# Patient Record
Sex: MALE | Race: WHITE | NOT HISPANIC OR LATINO | Employment: UNEMPLOYED | URBAN - METROPOLITAN AREA
[De-identification: names, ages, dates, MRNs, and addresses within clinical notes are randomized per-mention and may not be internally consistent; named-entity substitution may affect disease eponyms.]

---

## 2025-01-31 ENCOUNTER — HOSPITAL ENCOUNTER (EMERGENCY)
Facility: HOSPITAL | Age: 40
Discharge: HOME | End: 2025-02-01
Attending: EMERGENCY MEDICINE
Payer: MEDICAID

## 2025-01-31 ENCOUNTER — APPOINTMENT (OUTPATIENT)
Dept: CARDIOLOGY | Facility: HOSPITAL | Age: 40
End: 2025-01-31
Payer: MEDICAID

## 2025-01-31 DIAGNOSIS — Z00.8 ENCOUNTER FOR PSYCHOLOGICAL EVALUATION: Primary | ICD-10-CM

## 2025-01-31 LAB
ALBUMIN SERPL BCP-MCNC: 4.3 G/DL (ref 3.4–5)
ALP SERPL-CCNC: 64 U/L (ref 33–120)
ALT SERPL W P-5'-P-CCNC: 50 U/L (ref 10–52)
AMPHETAMINES UR QL SCN: NORMAL
ANION GAP SERPL CALC-SCNC: 16 MMOL/L (ref 10–20)
APAP SERPL-MCNC: <10 UG/ML
APPEARANCE UR: CLEAR
AST SERPL W P-5'-P-CCNC: 51 U/L (ref 9–39)
BARBITURATES UR QL SCN: NORMAL
BASOPHILS # BLD AUTO: 0.02 X10*3/UL (ref 0–0.1)
BASOPHILS NFR BLD AUTO: 0.3 %
BENZODIAZ UR QL SCN: NORMAL
BILIRUB SERPL-MCNC: 0.5 MG/DL (ref 0–1.2)
BILIRUB UR STRIP.AUTO-MCNC: NEGATIVE MG/DL
BUN SERPL-MCNC: 13 MG/DL (ref 6–23)
BZE UR QL SCN: NORMAL
CALCIUM SERPL-MCNC: 8.7 MG/DL (ref 8.6–10.3)
CANNABINOIDS UR QL SCN: NORMAL
CHLORIDE SERPL-SCNC: 104 MMOL/L (ref 98–107)
CO2 SERPL-SCNC: 24 MMOL/L (ref 21–32)
COLOR UR: YELLOW
CREAT SERPL-MCNC: 1.03 MG/DL (ref 0.5–1.3)
EGFRCR SERPLBLD CKD-EPI 2021: >90 ML/MIN/1.73M*2
EOSINOPHIL # BLD AUTO: 0.14 X10*3/UL (ref 0–0.7)
EOSINOPHIL NFR BLD AUTO: 2.1 %
ERYTHROCYTE [DISTWIDTH] IN BLOOD BY AUTOMATED COUNT: 13.2 % (ref 11.5–14.5)
ETHANOL SERPL-MCNC: <10 MG/DL
FENTANYL+NORFENTANYL UR QL SCN: NORMAL
GLUCOSE SERPL-MCNC: 199 MG/DL (ref 74–99)
GLUCOSE UR STRIP.AUTO-MCNC: NORMAL MG/DL
HCT VFR BLD AUTO: 44 % (ref 41–52)
HGB BLD-MCNC: 15 G/DL (ref 13.5–17.5)
HYALINE CASTS #/AREA URNS AUTO: ABNORMAL /LPF
IMM GRANULOCYTES # BLD AUTO: 0.07 X10*3/UL (ref 0–0.7)
IMM GRANULOCYTES NFR BLD AUTO: 1.1 % (ref 0–0.9)
KETONES UR STRIP.AUTO-MCNC: ABNORMAL MG/DL
LEUKOCYTE ESTERASE UR QL STRIP.AUTO: NEGATIVE
LYMPHOCYTES # BLD AUTO: 1.42 X10*3/UL (ref 1.2–4.8)
LYMPHOCYTES NFR BLD AUTO: 21.5 %
MCH RBC QN AUTO: 30.4 PG (ref 26–34)
MCHC RBC AUTO-ENTMCNC: 34.1 G/DL (ref 32–36)
MCV RBC AUTO: 89 FL (ref 80–100)
METHADONE UR QL SCN: NORMAL
MONOCYTES # BLD AUTO: 0.76 X10*3/UL (ref 0.1–1)
MONOCYTES NFR BLD AUTO: 11.5 %
MUCOUS THREADS #/AREA URNS AUTO: ABNORMAL /LPF
NEUTROPHILS # BLD AUTO: 4.2 X10*3/UL (ref 1.2–7.7)
NEUTROPHILS NFR BLD AUTO: 63.5 %
NITRITE UR QL STRIP.AUTO: NEGATIVE
NRBC BLD-RTO: 0 /100 WBCS (ref 0–0)
OPIATES UR QL SCN: NORMAL
OXYCODONE+OXYMORPHONE UR QL SCN: NORMAL
PCP UR QL SCN: NORMAL
PH UR STRIP.AUTO: 6 [PH]
PLATELET # BLD AUTO: 198 X10*3/UL (ref 150–450)
POTASSIUM SERPL-SCNC: 3.5 MMOL/L (ref 3.5–5.3)
PROT SERPL-MCNC: 6.7 G/DL (ref 6.4–8.2)
PROT UR STRIP.AUTO-MCNC: ABNORMAL MG/DL
RBC # BLD AUTO: 4.93 X10*6/UL (ref 4.5–5.9)
RBC # UR STRIP.AUTO: NEGATIVE /UL
RBC #/AREA URNS AUTO: ABNORMAL /HPF
SALICYLATES SERPL-MCNC: <3 MG/DL
SODIUM SERPL-SCNC: 140 MMOL/L (ref 136–145)
SP GR UR STRIP.AUTO: 1.03
UROBILINOGEN UR STRIP.AUTO-MCNC: ABNORMAL MG/DL
WBC # BLD AUTO: 6.6 X10*3/UL (ref 4.4–11.3)
WBC #/AREA URNS AUTO: ABNORMAL /HPF

## 2025-01-31 PROCEDURE — 80053 COMPREHEN METABOLIC PANEL: CPT | Performed by: PHYSICIAN ASSISTANT

## 2025-01-31 PROCEDURE — 36415 COLL VENOUS BLD VENIPUNCTURE: CPT | Performed by: PHYSICIAN ASSISTANT

## 2025-01-31 PROCEDURE — 81001 URINALYSIS AUTO W/SCOPE: CPT | Performed by: PHYSICIAN ASSISTANT

## 2025-01-31 PROCEDURE — 2500000001 HC RX 250 WO HCPCS SELF ADMINISTERED DRUGS (ALT 637 FOR MEDICARE OP): Performed by: PHYSICIAN ASSISTANT

## 2025-01-31 PROCEDURE — 85025 COMPLETE CBC W/AUTO DIFF WBC: CPT | Performed by: PHYSICIAN ASSISTANT

## 2025-01-31 PROCEDURE — 80307 DRUG TEST PRSMV CHEM ANLYZR: CPT | Performed by: PHYSICIAN ASSISTANT

## 2025-01-31 PROCEDURE — 93005 ELECTROCARDIOGRAM TRACING: CPT

## 2025-01-31 PROCEDURE — 80320 DRUG SCREEN QUANTALCOHOLS: CPT | Performed by: PHYSICIAN ASSISTANT

## 2025-01-31 PROCEDURE — 99285 EMERGENCY DEPT VISIT HI MDM: CPT | Performed by: EMERGENCY MEDICINE

## 2025-01-31 RX ORDER — ACETAMINOPHEN 325 MG/1
975 TABLET ORAL ONCE
Status: COMPLETED | OUTPATIENT
Start: 2025-01-31 | End: 2025-01-31

## 2025-01-31 RX ADMIN — ACETAMINOPHEN 975 MG: 325 TABLET, FILM COATED ORAL at 21:20

## 2025-01-31 SDOH — HEALTH STABILITY: MENTAL HEALTH: HAVE YOU WISHED YOU WERE DEAD OR WISHED YOU COULD GO TO SLEEP AND NOT WAKE UP?: YES

## 2025-01-31 SDOH — HEALTH STABILITY: MENTAL HEALTH: HAVE YOU EVER DONE ANYTHING, STARTED TO DO ANYTHING, OR PREPARED TO DO ANYTHING TO END YOUR LIFE?: NO

## 2025-01-31 SDOH — HEALTH STABILITY: MENTAL HEALTH: BEHAVIORS/MOOD: COOPERATIVE

## 2025-01-31 SDOH — HEALTH STABILITY: MENTAL HEALTH: SUICIDE ASSESSMENT: ADULT (C-SSRS)

## 2025-01-31 SDOH — HEALTH STABILITY: MENTAL HEALTH: BEHAVIORAL HEALTH(WDL): EXCEPTIONS TO WDL

## 2025-01-31 SDOH — HEALTH STABILITY: MENTAL HEALTH: SLEEP PATTERN: UNABLE TO ASSESS

## 2025-01-31 SDOH — HEALTH STABILITY: MENTAL HEALTH: FOR HIGH RISK PATIENTS: 1:1 PATIENT OBSERVER AT ALL TIMES

## 2025-01-31 SDOH — HEALTH STABILITY: MENTAL HEALTH: BEHAVIORS/MOOD: SAD;COOPERATIVE

## 2025-01-31 SDOH — HEALTH STABILITY: MENTAL HEALTH: HAVE YOU ACTUALLY HAD ANY THOUGHTS OF KILLING YOURSELF?: NO

## 2025-01-31 ASSESSMENT — PAIN - FUNCTIONAL ASSESSMENT: PAIN_FUNCTIONAL_ASSESSMENT: 0-10

## 2025-01-31 ASSESSMENT — PAIN DESCRIPTION - FREQUENCY: FREQUENCY: SEVERAL DAYS A WEEK

## 2025-01-31 ASSESSMENT — PAIN DESCRIPTION - LOCATION: LOCATION: BACK

## 2025-01-31 ASSESSMENT — PAIN SCALES - GENERAL
PAINLEVEL_OUTOF10: 6
PAINLEVEL_OUTOF10: 2

## 2025-01-31 ASSESSMENT — PAIN DESCRIPTION - PAIN TYPE: TYPE: CHRONIC PAIN

## 2025-01-31 ASSESSMENT — PAIN DESCRIPTION - ORIENTATION: ORIENTATION: RIGHT;LEFT

## 2025-02-01 VITALS
RESPIRATION RATE: 16 BRPM | HEIGHT: 73 IN | OXYGEN SATURATION: 99 % | HEART RATE: 87 BPM | DIASTOLIC BLOOD PRESSURE: 94 MMHG | BODY MASS INDEX: 29.16 KG/M2 | WEIGHT: 220 LBS | TEMPERATURE: 97.2 F | SYSTOLIC BLOOD PRESSURE: 166 MMHG

## 2025-02-01 LAB — HOLD SPECIMEN: NORMAL

## 2025-02-01 SDOH — HEALTH STABILITY: MENTAL HEALTH: BEHAVIORS/MOOD: CALM;COOPERATIVE

## 2025-02-01 SDOH — HEALTH STABILITY: MENTAL HEALTH: BEHAVIORAL HEALTH(WDL): EXCEPTIONS TO WDL

## 2025-02-01 SDOH — HEALTH STABILITY: MENTAL HEALTH: ANXIETY SYMPTOMS: GENERALIZED

## 2025-02-01 SDOH — HEALTH STABILITY: MENTAL HEALTH: HAVE YOU WISHED YOU WERE DEAD OR WISHED YOU COULD GO TO SLEEP AND NOT WAKE UP?: YES

## 2025-02-01 SDOH — ECONOMIC STABILITY: HOUSING INSECURITY: FEELS SAFE LIVING IN HOME: YES

## 2025-02-01 SDOH — HEALTH STABILITY: MENTAL HEALTH: SUICIDE ASSESSMENT: ADULT (C-SSRS)

## 2025-02-01 SDOH — HEALTH STABILITY: MENTAL HEALTH: FOR HIGH RISK PATIENTS: 1:1 PATIENT OBSERVER AT ALL TIMES

## 2025-02-01 SDOH — HEALTH STABILITY: MENTAL HEALTH: WISH TO BE DEAD (PAST 1 MONTH): YES

## 2025-02-01 SDOH — HEALTH STABILITY: MENTAL HEALTH: BEHAVIORS/MOOD: COOPERATIVE

## 2025-02-01 SDOH — HEALTH STABILITY: MENTAL HEALTH: HAVE YOU ACTUALLY HAD ANY THOUGHTS OF KILLING YOURSELF?: NO

## 2025-02-01 SDOH — SOCIAL STABILITY: SOCIAL NETWORK: PARENT/GUARDIAN/SIGNIFICANT OTHER INVOLVEMENT: NO INVOLVEMENT

## 2025-02-01 SDOH — HEALTH STABILITY: MENTAL HEALTH: HAVE YOU EVER DONE ANYTHING, STARTED TO DO ANYTHING, OR PREPARED TO DO ANYTHING TO END YOUR LIFE?: NO

## 2025-02-01 SDOH — HEALTH STABILITY: MENTAL HEALTH: SUICIDAL BEHAVIOR (LIFETIME): NO

## 2025-02-01 SDOH — HEALTH STABILITY: MENTAL HEALTH: SUICIDAL BEHAVIOR (3 MONTHS): NO

## 2025-02-01 SDOH — HEALTH STABILITY: MENTAL HEALTH: DEPRESSION SYMPTOMS: FEELINGS OF HELPLESSNESS

## 2025-02-01 SDOH — HEALTH STABILITY: MENTAL HEALTH: ACTIVE SUICIDAL IDEATION WITH SOME INTENT TO ACT, WITHOUT SPECIFIC PLAN (PAST 1 MONTH): NO

## 2025-02-01 SDOH — HEALTH STABILITY: MENTAL HEALTH: ACTIVE SUICIDAL IDEATION WITH SPECIFIC PLAN AND INTENT (PAST 1 MONTH): NO

## 2025-02-01 SDOH — HEALTH STABILITY: MENTAL HEALTH: NON-SPECIFIC ACTIVE SUICIDAL THOUGHTS (PAST 1 MONTH): YES

## 2025-02-01 SDOH — HEALTH STABILITY: MENTAL HEALTH: BEHAVIORS/MOOD: COOPERATIVE;APPROPRIATE FOR SITUATION

## 2025-02-01 ASSESSMENT — LIFESTYLE VARIABLES
SUBSTANCE_ABUSE_PAST_12_MONTHS: NO
PRESCIPTION_ABUSE_PAST_12_MONTHS: NO

## 2025-02-01 NOTE — ED TRIAGE NOTES
Pt arrives to ED via ems from group home. Pt reports having flash backs of his father telling him to kill himself.  Pt is cooperative. PT is A&Ox4 and ambulatory hx of bipolar and schzhorphenia

## 2025-02-01 NOTE — PROGRESS NOTES
EPAT - Social Work Psychiatric Assessment    Arrival Details  Mode of Arrival: Ambulance  Admission Source: Home  Admission Type: Involuntary  EPAT Assessment Start Date: 02/01/25  EPAT Assessment Start Time: 0735  Name of : CHAU Garsia LSW    History of Present Illness  Admission Reason: Suicidal Ideation  HPI:     Patient is a 38yo male presenting to the ED via EMS from group home with chief complaint of suicidal ideation. Reportedly, “pt reports having flash backs of his father telling him to kill himself” and “presents for suicidal ideation without plan”. Patient's chart, triage, and provider note reviewed prior to assessment. Patient has a psychiatric history of ADHD, Mood Disorder, ID/DD, PTSD, and Psychosis. He is connected with [facility] for outpatient care and currently resides at [facility]. Patient has a significant history of inpatient admissions, most recently to [facility] 11/2-11/14/24 for the same presentation, at which point he was discharged to his current group home. Of note, patient was also recently seen at [facility] ED on 1/29/25 for “anger” and was discharged with a fill of his Zyprexa. Patient is up to date with medication, last received his monthly SANDOVAL on 1/20/25. He denied history of SIB/SA and was indicated low risk to self throughout his ED visit per frequent screener flowsheet. BAL and UTOX negative on arrival.     SW Readmission Information   Readmission within 30 Days: Yes  Previous ED Visit Date and Reason : [facility] ED 1/29/25 for anger  Previous Discharge Date and Location: same day d/c  Factors Contributing to  Readmission Inpatient/ED (Team Perspective): Cognitively Limited  Readmission Factors Team Comments: low threshold for stress, cognitive impairment    Psychiatric Symptoms  Anxiety Symptoms: Generalized  Depression Symptoms: Feelings of helplessness  Jenise Symptoms: No problems reported or observed.    Psychosis Symptoms  Hallucination Type:  Voices commenting  Delusion Type: No problems reported or observed.    Additional Symptoms - Adult  Generalized Anxiety Disorder: Difficult to control worry, Restlessness  Obsessive Compulsive Disorder: No problems reported or observed.  Panic Attack: No problems reported or observed.  Post Traumatic Stress Disorder: Traumatic event  Delirium: No problems reported or observed.    Past Psychiatric History/Meds/Treatments  Past Psychiatric History: Psychiatric Diagnosis: ADHD, Mood Disorder, ID/DD, PTSD, Psychosis // Current  Center: [facility] // Previous Admissions: [facility], most recently [facility] 11/2-11/14/24  Past Psychiatric Meds/Treatments: current med list: Start Zyprexa 5 mg by mouth at bedtime.     Continue Depakote 1000 mg twice daily.    Continue Prozac 20 mg po daily.     Received Haldol Decanoate 200 mg IM 1/20/25, next scheduled for 2/17/25 340 pm  Past Violence/Victimization History: None noted during ED visit; hx of behavioral outbursts per group home    Current Mental Health Contacts   Name/Phone Number: Stef Coreas [facility]   Last Appointment Date: 1/27/25  Provider Name/Phone Number: MONET Hodges  Provider Last Appointment Date: 1/20/25; upcoming 2/17/25    Support System: Immediate family, Community    Living Arrangement: House, Lives with someone    Home Safety  Feels Safe Living in Home: Yes    Income Information  Employment Status for: Patient  Employment Status: Disabled  Income Source: Disability  Who Man[facility] Finances if Patient Unable[facility]    [facility] Service/Education History  Current or Previous  Service: None  Education Level:  (did not assess)    Social/Cultural History  Social History: [place] Citizen: yes // Payee: [employer] // Guardian/POA: self  Cultural Requests During Hospitalization: none  Spiritual Requests During Hospitalization: none  Important Activities: Family    Legal  Criminal Activity/ Legal Involvement Pertinent to Current  Situation/ Hospitalization: None    Drug Screening  Have you used any substances (canabis, cocaine, heroin, hallucinogens, inhalants, etc.) in the past 12 months?: No  Have you used any prescription drugs other than prescribed in the past 12 months?: No  Is a toxicology screen needed?: No    Stage of Change  Stage of Change:  (n/a)    Behavioral Health  Behavioral Health(WDL): Within Defined Limits  Behaviors/Mood: Calm, Cooperative  Affect: Appropriate to circumstances    Orientation  Orientation Level: Oriented X4    General Appearance  Motor Activity: Unremarkable  Speech Pattern: Slow  General Attitude: Cooperative, Pleasant  Appearance/Hygiene: Disheveled    Thought Process  Coherency: Cache thinking  Content: Blaming others  Delusions:  (None)  Perception: Not altered  Hallucination: None  Judgment/Insight:  (Limited at baseline)  Confusion: None  Cognition: Appropriate safety awareness, Appropriate attention/concentration, Cognitive delay    Sleep Pattern  Sleep Pattern: Insomnia (Chronic per pt)    Risk Factors  Self Harm/Suicidal Ideation Plan: vague thoughts of SI, reported to be chronic per pt  Previous Self Harm/Suicidal Plans: Denies  Risk Factors: Male, Major mental illness, Lower IQ    Violence Risk Assessment  Assessment of Violence: None noted  Thoughts of Harm to Others: No    Ability to Assess Risk Screen  Risk Screen - Ability to Assess: Able to be screened  [place] Suicide Severity Rating Scale (Screener/Recent Self-Report)  1. Wish to be Dead (Past 1 Month): Yes  2. Non-Specific Active Suicidal Thoughts (Past 1 Month): Yes  3. Active Suicidal Ideation with any Methods (Not Plan) Without Intent to Act (Past 1 Month): No  4. Active Suicidal Ideation with Some Intent to Act, Without Specific Plan (Past 1 Month): No  5. Active Suicidal Ideation with Specific Plan and Intent (Past 1 Month): No  6. Suicidal Behavior (Lifetime): No  6. Suicidal Behavior (3 Months): No  Calculated C-SSRS Risk Score  (Lifetime/Recent): Low Risk  Step 1: Risk Factors  Current & Past Psychiatric Dx: Mood disorder, ADHD  Presenting Symptoms: Anxiety and/or panic  Precipitants/Stressors: Triggering events leading to humiliation, shame, and/or despair (e.g. loss of relationship, financial or health status) (real or anticipated), Pending incarceration or homelessness  Access to Lethal Methods : No  Step 2: Protective Factors   Protective Factors Internal: Fear of death or the actual act of killing self, Identifies reasons for living  Protective Factors External: Cultural, spiritual and/or moral attitudes against suicide, Supportive social network or family or friends, Positive therapeutic relationships  Step 3: Suicidal Ideation Intensity  How Many Times Have You Had These Thoughts: Less than once a week  When You Have the Thoughts How Long do They Last : Fleeting - few seconds or minutes  Could/Can You Stop Thinking About Killing Yourself or Wanting to Die if You Want to: Easily able to control thoughts  Are There Things - Anyone or Anything - That Stopped You From Wanting to Die or Acting on: Deterrents definitely stopped you from attempting suicide  What Sort of Reasons Did You Have For Thinking About Wanting to Die or Killing Yourself: Completely to get attention, revenge, or a reaction from others  Total Score: 5  Step 5: Documentation  Risk Level: Low suicide risk (Patient indicated low acute risk to self during assessment; denied SI plan/intent and identified current level of passive SI to be his baseline. Discussed with Dr. Prater)    Psychiatric Impression and Plan of Care  Assessment and Plan:    Patient has remained calm, cooperative, and in behavioral control throughout this ED visit, which continued with this writer. Upon assessment the patient demonstrated notably concrete thought process and limited insight consistent with known cognitive delay. Patient expressed belief his SANDOVAL “is not working” due to concerns regarding  “my attitude”. Patient endorsed trouble controlling his anger “when people call me names” but denied any desire to harm others as a result. He expressed concern that he will be evicted from his group home “if I don't change my attitude”. The patient further clarified that he “just doesn't like men”, which is why he has been acting out with staff at his home. Patient endorsed consistent staffing in the home, denied access to weapons, and endorsed continued compliance with medication that is administered by  staff.     The patient did endorse passive SI at time of interview, however, clarified that “I always feel this way” due to the trauma of his father's passing approx. 20 years ago. He otherwise denied active HI/AVH, no delusions were elicited, and patient did not appear to be responding to internal stimuli. When asked regarding previous reports of CAH, patient shared he “always hears my dad's voice” but denied active hallucinations or desire/intent to act when experiencing them previously. He identified his mother as primary support and deterrent to self-harm. Patient also endorsed significant support in his outpatient psychiatrist and was able to appropriately identify his upcoming appointments. The patient remained independently and spontaneously future/goal-oriented in discussing his future goals, upcoming plan for care, and desire to be placed in a different group home moving forward. He was able to participate in efforts to safety plan and identify individuals he would reach out to, including his CM or psychiatrist, if symptoms were to worsen.     This writer called 's group home, [facility], at [phone]. The individual who answered the phone identified herself as “the staff member” and requested this writer contact the  Owner, Aries, for further collateral information. The only piece of information shared was that patient had been “throwing things in the living room” and “was being  mean”. She made no mention of concern for SI/HI, even when asked explicitly.     This writer attempted to contact  owner, Aries, at [phone] multiple times. Voicemail was left with no return call at time of assessment.     Diagnostic Impression: Unspecified Mood Disorder, PTSD, ID     Psychiatric Impression and Plan for Care: Patient does not present as an acute risk to self or others & does not meet criteria for inpatient admission as he appears to be presenting at his psychiatric baseline according to both pt's self-reports and prior chart documentation. Recommendation for discharge discussed with Piero Prater MD who is in agreement.    Specific Resources Provided to Patient: Discharge back to [facility] Notified:  staff    Outcome/Disposition  Patient's Perception of Outcome Achieved: Pt requesting to be admitted due to concern he will be evicted from group home  Assessment, Recommendations and Risk Level Reviewed with: Dr. Prater  Contact Name: Aries Mejiainette  Contact Number(s): [phone]  Contact Relationship:  Owner  EPAT Assessment Completed Date: 02/01/25  EPAT Assessment Completed Time: 0949  Patient Disposition: Home

## 2025-02-01 NOTE — PROGRESS NOTES
Emergency Department Transition of Care Note       Signout   I received Rubén Page in signout from Dr. Gudino.  Please see the ED Provider Note for all HPI, PE and MDM up to the time of signout at 0700.  This is in addition to the primary record.    In brief Rubén Page is an 39 y.o. male presenting for SI, psych eval    At the time of signout we were awaiting:  EPAT Evaluation    ED Course & Medical Decision Making   Medical Decision Making:  For the remainder of the patient's ED course and medical decision making, please see updates in the ED course section below.    ED Course:  ED Course as of 02/01/25 1435   Sat Feb 01, 2025   0819 Patient  evaluated after signout at 7 AM, EPAT was recently evaluated him, we are awaiting EPAT recommendations.  Patient requesting his home medicines.  Unsure what they are.  Will have pharmacy tech perform med rec. [SH]   0842 EPAT called and states that this seems consistent with patient's baseline, they are recommending discharge at this time.  Will reevaluate patient and likely discharge home. [SH]      ED Course User Index  [SH] Piero Prater MD         Diagnoses as of 02/01/25 1435   Encounter for psychological evaluation       Disposition   As a result of the work-up, the patient was discharged home.  he was informed of his diagnosis and instructed to come back with any concerns or worsening of condition.  he and was agreeable to the plan as discussed above.  he was given the opportunity to ask questions.  All of the patient's questions were answered.    Procedures   Procedures        Piero Prater MD  Emergency Medicine

## 2025-02-03 LAB
ATRIAL RATE: 82 BPM
P AXIS: 38 DEGREES
PR INTERVAL: 169 MS
Q ONSET: 253 MS
QRS COUNT: 14 BEATS
QRS DURATION: 110 MS
QT INTERVAL: 357 MS
QTC CALCULATION(BAZETT): 420 MS
QTC FREDERICIA: 398 MS
R AXIS: 37 DEGREES
T AXIS: 20 DEGREES
T OFFSET: 431 MS
VENTRICULAR RATE: 83 BPM

## 2025-07-27 ENCOUNTER — APPOINTMENT (OUTPATIENT)
Dept: RADIOLOGY | Facility: HOSPITAL | Age: 40
End: 2025-07-27
Payer: MEDICAID

## 2025-07-27 ENCOUNTER — HOSPITAL ENCOUNTER (EMERGENCY)
Facility: HOSPITAL | Age: 40
Discharge: HOME | End: 2025-07-27
Attending: EMERGENCY MEDICINE
Payer: MEDICAID

## 2025-07-27 VITALS
WEIGHT: 271 LBS | TEMPERATURE: 97.5 F | HEIGHT: 75 IN | RESPIRATION RATE: 16 BRPM | SYSTOLIC BLOOD PRESSURE: 160 MMHG | BODY MASS INDEX: 33.69 KG/M2 | OXYGEN SATURATION: 97 % | HEART RATE: 95 BPM | DIASTOLIC BLOOD PRESSURE: 91 MMHG

## 2025-07-27 DIAGNOSIS — S70.359A: ICD-10-CM

## 2025-07-27 DIAGNOSIS — M25.462 KNEE EFFUSION, LEFT: Primary | ICD-10-CM

## 2025-07-27 PROCEDURE — 73564 X-RAY EXAM KNEE 4 OR MORE: CPT | Mod: LEFT SIDE | Performed by: RADIOLOGY

## 2025-07-27 PROCEDURE — 73564 X-RAY EXAM KNEE 4 OR MORE: CPT | Mod: LT

## 2025-07-27 PROCEDURE — 99284 EMERGENCY DEPT VISIT MOD MDM: CPT | Performed by: EMERGENCY MEDICINE

## 2025-07-27 PROCEDURE — 99283 EMERGENCY DEPT VISIT LOW MDM: CPT | Performed by: EMERGENCY MEDICINE

## 2025-07-27 PROCEDURE — 2500000001 HC RX 250 WO HCPCS SELF ADMINISTERED DRUGS (ALT 637 FOR MEDICARE OP): Mod: SE

## 2025-07-27 RX ORDER — METHOCARBAMOL 500 MG/1
500 TABLET, FILM COATED ORAL 4 TIMES DAILY
Qty: 28 TABLET | Refills: 0 | Status: SHIPPED | OUTPATIENT
Start: 2025-07-27 | End: 2025-08-03

## 2025-07-27 RX ORDER — METHOCARBAMOL 500 MG/1
500 TABLET, FILM COATED ORAL ONCE
Status: COMPLETED | OUTPATIENT
Start: 2025-07-27 | End: 2025-07-27

## 2025-07-27 RX ORDER — ACETAMINOPHEN 500 MG
1000 TABLET ORAL EVERY 6 HOURS PRN
Qty: 30 TABLET | Refills: 0 | Status: SHIPPED | OUTPATIENT
Start: 2025-07-27 | End: 2025-08-06

## 2025-07-27 RX ORDER — ACETAMINOPHEN 325 MG/1
975 TABLET ORAL ONCE
Status: COMPLETED | OUTPATIENT
Start: 2025-07-27 | End: 2025-07-27

## 2025-07-27 RX ADMIN — ACETAMINOPHEN 975 MG: 325 TABLET ORAL at 13:15

## 2025-07-27 RX ADMIN — METHOCARBAMOL TABLETS 500 MG: 500 TABLET, COATED ORAL at 13:14

## 2025-07-27 ASSESSMENT — PAIN - FUNCTIONAL ASSESSMENT: PAIN_FUNCTIONAL_ASSESSMENT: 0-10

## 2025-07-27 ASSESSMENT — PAIN DESCRIPTION - LOCATION: LOCATION: KNEE

## 2025-07-27 ASSESSMENT — PAIN DESCRIPTION - ORIENTATION: ORIENTATION: LEFT

## 2025-07-27 NOTE — ED PROVIDER NOTES
Emergency Department Provider Note        History of Present Illness     History provided by: Patient  Limitations to History: None  External Records Reviewed with Brief Summary: Reviewed in chart    HPI:  Rubén Page is a 39 y.o. male with past medical history of seizures presenting after he fell off his bed after he had a seizure yesterday and injured his left knee.  Patient reports that he was on the bottom bunk and had a seizure resulting in the fall.  Patient reports he has been unable to bear weight on his left leg.  He reports that he hears a clicking sound and feels that it is popping out of place.  He denies fever, chills, chest pain, shortness of breath, neck stiffness, back pain, urinary symptoms.    Physical Exam   Triage vitals:  T 36.4 °C (97.5 °F)  HR 95  BP (!) 160/91  RR 16  O2 97 % None (Room air)    General: Awake, alert, in no acute distress  Eyes: Gaze conjugate.  No scleral icterus or injection  HENT: Normo-cephalic, atraumatic. No stridor  CV: Regular rate, regular rhythm. Radial pulses 2+ bilaterally; 2+ DP pulses bilaterally  Resp: Breathing non-labored, speaking in full sentences.  Clear to auscultation bilaterally  GI: Soft, non-distended, non-tender. No rebound or guarding.  : Deferred  MSK/Extremities: No gross bony deformities. Moving all extremities.    Skin: Warm. Appropriate color  Neuro: Alert. Oriented. Face symmetric. Speech is fluent.  Gross strength and sensation intact in b/l UE and LEs  Psych: Appropriate mood and affect    Medical Decision Making & ED Course   Medical Decision Makin y.o. male past medical history of seizure presenting after a fall from his bed to his shelter. Differential includes but not limited to knee fracture, sprain, strain. Imaging ordered: Left knee x-ray. Treatments including Tylenol Robaxin were administered. Imaging reviewed and notable for small joint effusion no acute fracture or misalignment.  Of note there was a needlelike  radiopaque foreign body in his thigh.  Patient was informed of this and overlying skin did not show any signs of infection.  On re-examination of patient, patient reports some improvement with pain meds administered. Patient and/or patient's representative was counseled regarding labs, imaging, likely diagnosis, and plan. All questions were answered.  He was discharged in stable condition with crutches, description for Tylenol and Robaxin and appropriate follow-up.  ----    Social Determinants of Health which Significantly Impact Care: None identified     EKG Independent Interpretation: EKG not obtained    Independent Result Review and Interpretation: Relevant laboratory and radiographic results were reviewed and independently interpreted by myself.  As necessary, they are commented on in the ED Course.    Chronic conditions affecting the patient's care: As documented above in [facility]    The patient was discussed with the following consultants/services: None    Care Considerations: As documented above in [facility]    ED Course:  ED Course as of 07/29/25 0750   Sun Jul 27, 2025   1303 XR knee left 4+ views  1. Small joint effusion. No acute fracture or malalignment.  2. Partially visualized linear needle-like radiopaque foreign body in  the anterior/lateral soft tissues of the distal thigh measuring up to  3.2 cm. Correlate with history.   [SL]      ED Course User Index  [SL] Valdez Sorto MD         Diagnoses as of 07/29/25 0750   Knee effusion, left   Metal foreign body in thigh     Disposition   As a result of the work-up, the patient was discharged home.  he was informed of his diagnosis and instructed to come back with any concerns or worsening of condition.  he and was agreeable to the plan as discussed above.  he was given the opportunity to ask questions.  All of the patient's questions were answered.    Procedures   Procedures    Patient seen and discussed with ED attending physician.    Valdez Sorto MD  Emergency  Medicine     Valdez Sorto MD  Resident  07/29/25 3441

## 2025-07-27 NOTE — ED TRIAGE NOTES
Pt presents to ED for L knee pain. Pt states pain started last night. Pt states he fell off his bed at the shelter last night and injured his knee. Pt states he is unable to bear weight on L leg. Pt seen yesterday at [facility] for R ankle injury s/p falling on sidewalk.

## 2025-07-29 ENCOUNTER — APPOINTMENT (OUTPATIENT)
Dept: ORTHOPEDIC SURGERY | Facility: HOSPITAL | Age: 40
End: 2025-07-29
Payer: MEDICAID